# Patient Record
(demographics unavailable — no encounter records)

---

## 2024-10-08 NOTE — HISTORY OF PRESENT ILLNESS
[FreeTextEntry1] : 58-year-old male presents as a new patient  CT May 2024: 5 mm left nonobstructing stone  Recently underwent right colectomy for colon cancer

## 2025-07-24 NOTE — ASSESSMENT
[FreeTextEntry1] : The patient has a distal rectal lesion adjacent to the dentate line.  Because of its association with the hemorrhoidal tissue a transanal excision was recommended.  Indications, risks, benefits, alternatives reviewed including but not limited to bleeding and infection.  The patient's daughter was present for the conversation and all questions were answered.

## 2025-07-24 NOTE — PHYSICAL EXAM
[FreeTextEntry1] : Perianal inspection unremarkable.  Palpable mobile soft mass right posterior position on digital exam.  Anoscopy revealed a 1.5 cm polypoid lesion in the right posterior position adjacent to the dentate line.  Anoscopy performed to evaluate anal canal.  No sedation required.

## 2025-07-24 NOTE — HISTORY OF PRESENT ILLNESS
[FreeTextEntry1] : Ace is a 59 y/o male being seen for a follow up visit, rectal polyp.   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 Pathology - 1. Omentum, omentectomy - Benign adipose tissue 2. Right colon, terminal ileum, appendix, right hemicolectomy - Invasive well-differentiated mucinous adenocarcinoma with submucosal invasion arising in a tubular adenoma within the ascending colon - Prior endoscopic mucosal resection site with reactive changes and no residual adenocarcinoma - Separate tubular adenoma with high-grade dysplasia - Diverticulosis - Resection margins are negative for neoplasm - Twenty-seven lymph nodes negative for carcinoma (0/27) - See DNA mismatch repair report below and synoptic summaries  Colonoscopy 4/16/24 (Therapeutic procedure for known colon adenoma) - Tortuous colon. 30 mm flat polyp in proximal ascending colon resected via piecemeal EMR/avulsion and retrieved. Clipped. 15 mm anorectal polyp resected and retrieved. Clipped. At least one additional right sided polyp not able to be reseceted during this session. Pathology: 1 Ascending colon polyp - Fragmented moderately differentiated adenocarcinoma with background tubular adenoma - MMR studies are in progress; an addendum to follow - See Synoptic Summary 2 Anal rectal polyp - Tubular adenoma(s) (multiple fragments)  Last seen on 6/26/24 - Doing well. Advance diet as tolerated. Genetics appointment coming up. Likely Cabello. Will need colonoscopy 1 year from previous exam. Can get CT scans and blood work at that time. Follow-up as needed before then.  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 In rare instances, this variant may be the result of somatic mutation found within the provided sample or the result of technical interference (see Interpretation and Assessment below)>  CT A+P and chest on 4/21/25 - No evidence of local recurrence or distant metastatic disease within the chest, abdomen, or pelvis.  Colonoscopy on 5/8/25 - Hemorrhoids found on perianal exam. Polypoid lesion in the distal rectum at the dentae line. Internal hemorrhoids. Diverticulosis in the sigmoid colon an in the descending colon. One 2 mm polyp in the sigmoid colon, removed with a cold biopsy forceps. Resected and retrieved. Patent side-to-side ileo-colonic anastomosis. Erythematous mucosa at the colonic anastomosis. Biopsied. The examined portion of the ileum was normal. The examined portion of the ileum was normal.  Pathology - 4. Colon, transverse, anastomosis, biopsy: Colonic mucosa with mild acute inflammation and scattered refractile material with associated foreign body-type giant cell reaction. See note. Note: Part 4. Multiple levels were examined Negative for dysplasia. 5. Colon, sigmoid, polyp, polypectomy Colonic mucosa with mild hyperplastic changes. See note. Note: Part 5. Multiple levels were examined.   Today pt reports no pain. Daily BMs, formed, no straining, no routine use of fiber supplements/laxatives, denies pain, has bleeding (dripping into bowl) since sx weekly that's becoming more frequent, no episodes of incontinence, and did feel one episode of prolapsing tissue. Not taking any anticoagulants.

## 2025-07-24 NOTE — HISTORY OF PRESENT ILLNESS
[FreeTextEntry1] : Ace is a 57 y/o male being seen for a follow up visit, rectal polyp.   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 Pathology - 1. Omentum, omentectomy - Benign adipose tissue 2. Right colon, terminal ileum, appendix, right hemicolectomy - Invasive well-differentiated mucinous adenocarcinoma with submucosal invasion arising in a tubular adenoma within the ascending colon - Prior endoscopic mucosal resection site with reactive changes and no residual adenocarcinoma - Separate tubular adenoma with high-grade dysplasia - Diverticulosis - Resection margins are negative for neoplasm - Twenty-seven lymph nodes negative for carcinoma (0/27) - See DNA mismatch repair report below and synoptic summaries  Colonoscopy 4/16/24 (Therapeutic procedure for known colon adenoma) - Tortuous colon. 30 mm flat polyp in proximal ascending colon resected via piecemeal EMR/avulsion and retrieved. Clipped. 15 mm anorectal polyp resected and retrieved. Clipped. At least one additional right sided polyp not able to be reseceted during this session. Pathology: 1 Ascending colon polyp - Fragmented moderately differentiated adenocarcinoma with background tubular adenoma - MMR studies are in progress; an addendum to follow - See Synoptic Summary 2 Anal rectal polyp - Tubular adenoma(s) (multiple fragments)  Last seen on 6/26/24 - Doing well. Advance diet as tolerated. Genetics appointment coming up. Likely [place]. Will need colonoscopy 1 year from previous exam. Can get CT scans and blood work at that time. Follow-up as needed before then.  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 In rare instances, this variant may be the result of somatic mutation found within the provided sample or the result of technical interference (see Interpretation and Assessment below)>  CT A+P and chest on 4/21/25 - No evidence of local recurrence or distant metastatic disease within the chest, abdomen, or pelvis.  Colonoscopy on 5/8/25 - Hemorrhoids found on perianal exam. Polypoid lesion in the distal rectum at the dentae line. Internal hemorrhoids. Diverticulosis in the sigmoid colon an in the descending colon. One 2 mm polyp in the sigmoid colon, removed with a cold biopsy forceps. Resected and retrieved. Patent side-to-side ileo-colonic anastomosis. Erythematous mucosa at the colonic anastomosis. Biopsied. The examined portion of the ileum was normal. The examined portion of the ileum was normal.  Pathology - 4. Colon, transverse, anastomosis, biopsy: Colonic mucosa with mild acute inflammation and scattered refractile material with associated foreign body-type giant cell reaction. See note. Note: Part 4. Multiple levels were examined Negative for dysplasia. 5. Colon, sigmoid, polyp, polypectomy Colonic mucosa with mild hyperplastic changes. See note. Note: Part 5. Multiple levels were examined.   Today pt reports no pain. Daily BMs, formed, no straining, no routine use of fiber supplements/laxatives, denies pain, has bleeding (dripping into bowl) since sx weekly that's becoming more frequent, no episodes of incontinence, and did feel one episode of prolapsing tissue. Not taking any anticoagulants.